# Patient Record
Sex: FEMALE | Race: WHITE | Employment: FULL TIME | URBAN - NONMETROPOLITAN AREA
[De-identification: names, ages, dates, MRNs, and addresses within clinical notes are randomized per-mention and may not be internally consistent; named-entity substitution may affect disease eponyms.]

---

## 2023-01-29 PROBLEM — E89.0 POSTOPERATIVE HYPOTHYROIDISM: Status: ACTIVE | Noted: 2023-01-29

## 2023-01-29 PROBLEM — Z98.890 S/P THYROID SURGERY: Status: ACTIVE | Noted: 2023-01-29

## 2023-01-29 PROBLEM — S92.919A TOE FRACTURE: Status: ACTIVE | Noted: 2023-01-29

## 2023-01-29 PROBLEM — D12.6 TUBULAR ADENOMA OF COLON: Status: ACTIVE | Noted: 2023-01-29

## 2023-01-29 PROBLEM — E55.9 MILD VITAMIN D DEFICIENCY: Status: ACTIVE | Noted: 2023-01-29

## 2023-01-29 PROBLEM — D72.819 LEUKOPENIA: Status: ACTIVE | Noted: 2023-01-29

## 2023-01-29 PROBLEM — S99.921A INJURY OF RIGHT FOOT: Status: ACTIVE | Noted: 2023-01-29

## 2023-01-29 RX ORDER — LEVOTHYROXINE SODIUM 50 UG/1
1 TABLET ORAL DAILY
COMMUNITY
End: 2023-03-14 | Stop reason: SDUPTHER

## 2023-01-29 RX ORDER — ERGOCALCIFEROL 1.25 MG/1
1 CAPSULE ORAL
COMMUNITY
End: 2024-03-15 | Stop reason: WASHOUT

## 2023-01-29 RX ORDER — TRETINOIN 1 MG/G
CREAM TOPICAL
COMMUNITY

## 2023-03-14 ENCOUNTER — OFFICE VISIT (OUTPATIENT)
Dept: PRIMARY CARE | Facility: CLINIC | Age: 53
End: 2023-03-14
Payer: COMMERCIAL

## 2023-03-14 ENCOUNTER — LAB (OUTPATIENT)
Dept: LAB | Facility: LAB | Age: 53
End: 2023-03-14
Payer: COMMERCIAL

## 2023-03-14 VITALS
BODY MASS INDEX: 20.63 KG/M2 | HEIGHT: 68 IN | HEART RATE: 65 BPM | DIASTOLIC BLOOD PRESSURE: 67 MMHG | RESPIRATION RATE: 12 BRPM | SYSTOLIC BLOOD PRESSURE: 102 MMHG | OXYGEN SATURATION: 98 % | WEIGHT: 136.1 LBS | TEMPERATURE: 97.9 F

## 2023-03-14 DIAGNOSIS — E55.9 VITAMIN D DEFICIENCY: ICD-10-CM

## 2023-03-14 DIAGNOSIS — Z00.00 HEALTH MAINTENANCE EXAMINATION: ICD-10-CM

## 2023-03-14 DIAGNOSIS — E89.0 POSTOPERATIVE HYPOTHYROIDISM: ICD-10-CM

## 2023-03-14 DIAGNOSIS — Z00.00 HEALTH MAINTENANCE EXAMINATION: Primary | ICD-10-CM

## 2023-03-14 LAB
ALANINE AMINOTRANSFERASE (SGPT) (U/L) IN SER/PLAS: 13 U/L (ref 7–45)
ALBUMIN (G/DL) IN SER/PLAS: 4.6 G/DL (ref 3.4–5)
ALKALINE PHOSPHATASE (U/L) IN SER/PLAS: 53 U/L (ref 33–110)
ANION GAP IN SER/PLAS: 12 MMOL/L (ref 10–20)
ASPARTATE AMINOTRANSFERASE (SGOT) (U/L) IN SER/PLAS: 16 U/L (ref 9–39)
BASOPHILS (10*3/UL) IN BLOOD BY AUTOMATED COUNT: 0.05 X10E9/L (ref 0–0.1)
BASOPHILS/100 LEUKOCYTES IN BLOOD BY AUTOMATED COUNT: 1 % (ref 0–2)
BILIRUBIN TOTAL (MG/DL) IN SER/PLAS: 0.7 MG/DL (ref 0–1.2)
CALCIUM (MG/DL) IN SER/PLAS: 9.3 MG/DL (ref 8.6–10.6)
CARBON DIOXIDE, TOTAL (MMOL/L) IN SER/PLAS: 27 MMOL/L (ref 21–32)
CHLORIDE (MMOL/L) IN SER/PLAS: 106 MMOL/L (ref 98–107)
CHOLESTEROL (MG/DL) IN SER/PLAS: 206 MG/DL (ref 0–199)
CHOLESTEROL IN HDL (MG/DL) IN SER/PLAS: 77.2 MG/DL
CHOLESTEROL/HDL RATIO: 2.7
CREATININE (MG/DL) IN SER/PLAS: 0.86 MG/DL (ref 0.5–1.05)
EOSINOPHILS (10*3/UL) IN BLOOD BY AUTOMATED COUNT: 0.09 X10E9/L (ref 0–0.7)
EOSINOPHILS/100 LEUKOCYTES IN BLOOD BY AUTOMATED COUNT: 1.8 % (ref 0–6)
ERYTHROCYTE DISTRIBUTION WIDTH (RATIO) BY AUTOMATED COUNT: 12.3 % (ref 11.5–14.5)
ERYTHROCYTE MEAN CORPUSCULAR HEMOGLOBIN CONCENTRATION (G/DL) BY AUTOMATED: 33.5 G/DL (ref 32–36)
ERYTHROCYTE MEAN CORPUSCULAR VOLUME (FL) BY AUTOMATED COUNT: 88 FL (ref 80–100)
ERYTHROCYTES (10*6/UL) IN BLOOD BY AUTOMATED COUNT: 4.42 X10E12/L (ref 4–5.2)
GFR FEMALE: 80 ML/MIN/1.73M2
GLUCOSE (MG/DL) IN SER/PLAS: 93 MG/DL (ref 74–99)
HEMATOCRIT (%) IN BLOOD BY AUTOMATED COUNT: 39.1 % (ref 36–46)
HEMOGLOBIN (G/DL) IN BLOOD: 13.1 G/DL (ref 12–16)
IMMATURE GRANULOCYTES/100 LEUKOCYTES IN BLOOD BY AUTOMATED COUNT: 0 % (ref 0–0.9)
LDL: 115 MG/DL (ref 0–99)
LEUKOCYTES (10*3/UL) IN BLOOD BY AUTOMATED COUNT: 4.9 X10E9/L (ref 4.4–11.3)
LYMPHOCYTES (10*3/UL) IN BLOOD BY AUTOMATED COUNT: 1.39 X10E9/L (ref 1.2–4.8)
LYMPHOCYTES/100 LEUKOCYTES IN BLOOD BY AUTOMATED COUNT: 28.2 % (ref 13–44)
MONOCYTES (10*3/UL) IN BLOOD BY AUTOMATED COUNT: 0.75 X10E9/L (ref 0.1–1)
MONOCYTES/100 LEUKOCYTES IN BLOOD BY AUTOMATED COUNT: 15.2 % (ref 2–10)
NEUTROPHILS (10*3/UL) IN BLOOD BY AUTOMATED COUNT: 2.65 X10E9/L (ref 1.2–7.7)
NEUTROPHILS/100 LEUKOCYTES IN BLOOD BY AUTOMATED COUNT: 53.8 % (ref 40–80)
NRBC (PER 100 WBCS) BY AUTOMATED COUNT: 0 /100 WBC (ref 0–0)
PLATELETS (10*3/UL) IN BLOOD AUTOMATED COUNT: 243 X10E9/L (ref 150–450)
POTASSIUM (MMOL/L) IN SER/PLAS: 4 MMOL/L (ref 3.5–5.3)
PROTEIN TOTAL: 6.6 G/DL (ref 6.4–8.2)
SODIUM (MMOL/L) IN SER/PLAS: 141 MMOL/L (ref 136–145)
TRIGLYCERIDE (MG/DL) IN SER/PLAS: 68 MG/DL (ref 0–149)
UREA NITROGEN (MG/DL) IN SER/PLAS: 16 MG/DL (ref 6–23)
VLDL: 14 MG/DL (ref 0–40)

## 2023-03-14 PROCEDURE — 3008F BODY MASS INDEX DOCD: CPT | Performed by: FAMILY MEDICINE

## 2023-03-14 PROCEDURE — 82652 VIT D 1 25-DIHYDROXY: CPT

## 2023-03-14 PROCEDURE — 1036F TOBACCO NON-USER: CPT | Performed by: FAMILY MEDICINE

## 2023-03-14 PROCEDURE — 99396 PREV VISIT EST AGE 40-64: CPT | Performed by: FAMILY MEDICINE

## 2023-03-14 PROCEDURE — 85025 COMPLETE CBC W/AUTO DIFF WBC: CPT

## 2023-03-14 PROCEDURE — 84443 ASSAY THYROID STIM HORMONE: CPT

## 2023-03-14 PROCEDURE — 80061 LIPID PANEL: CPT

## 2023-03-14 PROCEDURE — 80053 COMPREHEN METABOLIC PANEL: CPT

## 2023-03-14 PROCEDURE — 36415 COLL VENOUS BLD VENIPUNCTURE: CPT

## 2023-03-14 RX ORDER — PROGESTERONE 100 MG/1
100 CAPSULE ORAL NIGHTLY
COMMUNITY
Start: 2023-02-27

## 2023-03-14 RX ORDER — LEVOTHYROXINE SODIUM 50 UG/1
50 CAPSULE ORAL DAILY
COMMUNITY

## 2023-03-14 RX ORDER — CLINDAMYCIN PHOSPHATE AND BENZOYL PEROXIDE 10; 37.5 MG/G; MG/G
GEL TOPICAL
COMMUNITY
Start: 2022-12-20

## 2023-03-14 RX ORDER — LIOTHYRONINE SODIUM 5 UG/1
5 TABLET ORAL
COMMUNITY
Start: 2023-02-23

## 2023-03-14 RX ORDER — ESTRADIOL 0.03 MG/D
1 FILM, EXTENDED RELEASE TRANSDERMAL
COMMUNITY
Start: 2023-03-10

## 2023-03-14 ASSESSMENT — PAIN SCALES - GENERAL: PAINLEVEL: 0-NO PAIN

## 2023-03-14 ASSESSMENT — PATIENT HEALTH QUESTIONNAIRE - PHQ9
1. LITTLE INTEREST OR PLEASURE IN DOING THINGS: NOT AT ALL
SUM OF ALL RESPONSES TO PHQ9 QUESTIONS 1 AND 2: 0
2. FEELING DOWN, DEPRESSED OR HOPELESS: NOT AT ALL

## 2023-03-14 NOTE — PROGRESS NOTES
"Subjective   Patient ID: Mirian Ramos is a 52 y.o. female who presents for Annual Exam (No other concerns to address) and Foot Injury (Broke toe in October on her right foot).    [facility]   Mirian was seen today for an annual wellness review.  She overall feels well, does feel some aches and pains more than previous years.  She remains active, does yoga, and more recently kettle bells.  She does have a degree of right medial epicondylar region pain, correlates with her kettle bell program.    Her gynecology care is up-to-date, including Pap smear, mammogram, thyroid management.    Colonoscopy is not due until 2027.  Review of Systems  The full, 10+ multi-organ review of systems, is within normal limits with the exception of what is noted above in HPI.  Objective   /67 (BP Location: Right arm, Patient Position: Sitting, BP Cuff Size: Adult)   Pulse 65   Temp 36.6 °C (97.9 °F) (Temporal)   Resp 12   Ht 1.727 m (5' 8\")   Wt 61.7 kg (136 lb 1.6 oz)   SpO2 98%   BMI 20.69 kg/m²     Physical Exam  Constitutional/General appearance: alert, oriented, well-appearing, in no distress  Head and face exam is normal  No scleral icterus or conjunctival erythema present  Hearing is grossly normal  Respiratory effort is normal, no dyspnea noted  Cortical function is normal  Mood, affect, are pleasant, appropriate, and interactive.  Insight is normal  Cardiac exam reveals a regular rate and rhythm, no murmurs, rubs or gallops present.   Lungs are clear bilaterally.    No lower extremity edema present.    Assessment/Plan     Today Mirian's wellness care was reviewed.  Please see HPI for details.  We will check routine labs today, call when the results are available.  Hormone care per gynecology wellness program.    Follow-up in 1 year.     "

## 2023-03-16 ENCOUNTER — TELEPHONE (OUTPATIENT)
Dept: PRIMARY CARE | Facility: CLINIC | Age: 53
End: 2023-03-16
Payer: COMMERCIAL

## 2023-03-16 DIAGNOSIS — E89.0 POSTOPERATIVE HYPOTHYROIDISM: Primary | ICD-10-CM

## 2023-03-16 LAB — THYROTROPIN (MIU/L) IN SER/PLAS BY DETECTION LIMIT <= 0.05 MIU/L: 1.22 MIU/L (ref 0.44–3.98)

## 2023-03-17 LAB — VITAMIN D 1,25-DIHYDROXY: 62.5 PG/ML (ref 19.9–79.3)

## 2023-07-19 NOTE — PROGRESS NOTES
Subjective   Patient ID: Mirian Ramos is a 52 y.o. female who presents for Sinus Problem (Pt is here with sinus issues- ears pop, can hear herself breathing, left ear hurts, no cough, no congestion).    HPI     Patient of Sameer Shepherd MD    Patient here for evaluation of otalgia and upper respiratory symptoms.    She states her symptoms started a few weeks ago.  She endorses sinus pressure, sinus drainage, and ear popping.  She does have some otalgia on the left and muffled hearing.  Her symptoms have remained the same since onset overall, however, she notes she started having stuffy nose today.    No one around her experiencing similar symptoms.  No recent flights.    She denies any sore throat, cough, or congestion.  She denies chest pain, palpitations, or SOB.  She denies fevers, chills, or unexplained weight changes.  She denies history of seasonal allergies.     Home treatments include hydrogen peroxide if related to ear wax but this did not provide relief of her discomfort. She is also taking OTC Ibuprofen.    Review of Systems   All other systems reviewed and are negative.      Objective   BP 95/55 (BP Location: Right arm, Patient Position: Sitting, BP Cuff Size: Small adult)   Pulse 60   Temp 36.5 °C (97.7 °F) (Temporal)   Resp 14   Wt 60.6 kg (133 lb 9.6 oz)   SpO2 97%   BMI 20.31 kg/m²     Physical Exam  Vitals and nursing note reviewed.   Constitutional:       General: She is not in acute distress.     Appearance: Normal appearance. She is not toxic-appearing.   HENT:      Head: Normocephalic and atraumatic.      Right Ear: Tympanic membrane, ear canal and external ear normal.      Left Ear: Tympanic membrane, ear canal and external ear normal.      Mouth/Throat:      Lips: Pink.      Mouth: Mucous membranes are moist.      Pharynx: Oropharynx is clear. No posterior oropharyngeal erythema.      Comments: Tonsoliths noted on left side  Eyes:      Extraocular Movements: Extraocular movements  intact.      Pupils: Pupils are equal, round, and reactive to light.      Comments: Coryza noted bilaterally   Neck:      Thyroid: No thyromegaly.   Cardiovascular:      Rate and Rhythm: Normal rate and regular rhythm.      Heart sounds: No murmur heard.     No friction rub. No gallop.   Pulmonary:      Effort: Pulmonary effort is normal.      Breath sounds: Normal breath sounds. No wheezing, rhonchi or rales.   Abdominal:      General: Bowel sounds are normal. There is no distension.      Palpations: Abdomen is soft. There is no mass.      Tenderness: There is no abdominal tenderness. There is no guarding.   Musculoskeletal:      Right lower leg: No edema.      Left lower leg: No edema.   Lymphadenopathy:      Cervical: No cervical adenopathy.   Skin:     General: Skin is warm and dry.   Neurological:      General: No focal deficit present.      Mental Status: She is alert and oriented to person, place, and time.   Psychiatric:         Mood and Affect: Mood normal.         Behavior: Behavior normal.         Assessment/Plan   Diagnoses and all orders for this visit:  Dysfunction of Eustachian tube, unspecified laterality  Coryza  Tonsillith    Patient's exam was not suggestive of bacterial infection but there were signs of inflammation such as coryza and tonsolith's and Eustachian Tube Dysfunction.     Would not recommend treating with antibiotic or oral steroid unless worsening or not improving in 7-10 days with the below measures (nasal sprays, mobilization of fluids. Patient to call office for prescription if symptoms worsen or not improved.    If secretions are THICK:   Drink at least 6-8 glasses of water daily to thin secretions.    Mucinex can be used if secretions remain thick.    If secretions are THIN, watery, and abundant:  Antihistamine such as loratadine (claritin) can help dry up a drippy nose.      Supplementation of 75 mg of zinc at first sign of cold-like symptoms has been shown to improve symptoms.       Gargling with warm salt water can help clear post nasal drip and soothe a sore throat.  Throat lozenges can also be helpful.      Fever or aches can be helped by taking acetaminophen (Tylenol) every four hours as needed, or ibuprofen (Motrin, Advil) or naproxen (Aleve) as directed if you are able.     EUSTACHIAN TUBE DISORDER  Symptoms described are consistent with Eustachian tube disorder. This is an impairment in the function of the ventilation tube which connects the middle ear ( behind the eardrum)  to the back of the throat. The opening of this ventilation tube, which allows the middle ear to drain fluid and to ventilate air freely,  can sometimes become inflamed and does not function properly. This can be caused by many problems, such as allergies, irritants such as smoke or dust, infections such as colds, ear infections,  or sinusitis, swelling for other reason such as acid reflux, or mechanical blockage with things such as polyps or other growths.  The symptoms of Eustachian tube dysfunction include ear fullness, pressure, whooshing or popping noises, difficulty with hearing, or pain-similar to changing altitude in an airplane.    To minimize these symptoms, we look to treat the underlying cause of poor ventilation. Swelling related to allergies or inflammation can sometimes be helped by nasal steroid sprays such as Flonase or Nasacort over time (7 - 10 days)     If you do not have high blood pressure or any other contraindication, you can use a nasal spray decongestant for up to 3 days. These are sprays such as Vicks or Afrin spray decongestants.    Other options to help open up nasal passages and Eustachian tubes can include non-medicine intervention such as steam, essential oils such as Eucalyptus, peppermint, rosemary added to a diffuser or humidifier.    Mobilizing fluid and helping with congestion through repetitive exercise such as rebounding on a mini- trampoline, jumping jacks, or running may  all help.     If you have allergies, adding in an antihistamine such as Claritin or Allegra can help quiet underlying swelling.    If you have a cold or sinus infection, after the infection begins to clear, the symptoms should improve on their own.    The Reinaldo technique can be used, which is a Osteopathic manual medicine technique to help with ear fullness.   Perform by pulling up and back on your ear- use a firm steady force and pull your ear up and back, until you feel a deep tug at the base of your ear. At the same time, use your other hand to pull the angle of your jaw (the same side of the face as the ear you are tugging) in the opposite direction - pull and massage the jaw area forward and down, away from your ear, thus stretching out the deep tissues that surround the Eustachian tube.        As a last resort for stubborn Eustachian tube dysfunction, we will occasionally use a very short course of prednisone or a trial of antibiotics if there are associated infectious symptoms.  Call the office back if the above measures do not help, and we can discuss next steps.     Follow-up with Dr. Shepherd for routine care.  Call for sooner follow-up if needed.     Scribe Attestation  By signing my name below, Camila CORDOBA Scribe   attest that this documentation has been prepared under the direction and in the presence of Lisa Nuñez DO.

## 2023-07-20 ENCOUNTER — OFFICE VISIT (OUTPATIENT)
Dept: PRIMARY CARE | Facility: CLINIC | Age: 53
End: 2023-07-20
Payer: COMMERCIAL

## 2023-07-20 VITALS
RESPIRATION RATE: 14 BRPM | WEIGHT: 133.6 LBS | SYSTOLIC BLOOD PRESSURE: 95 MMHG | HEART RATE: 60 BPM | DIASTOLIC BLOOD PRESSURE: 55 MMHG | BODY MASS INDEX: 20.31 KG/M2 | OXYGEN SATURATION: 97 % | TEMPERATURE: 97.7 F

## 2023-07-20 DIAGNOSIS — J00 CORYZA: ICD-10-CM

## 2023-07-20 DIAGNOSIS — H69.90 DYSFUNCTION OF EUSTACHIAN TUBE, UNSPECIFIED LATERALITY: Primary | ICD-10-CM

## 2023-07-20 DIAGNOSIS — J35.8 TONSILLITH: ICD-10-CM

## 2023-07-20 PROCEDURE — 3008F BODY MASS INDEX DOCD: CPT | Performed by: FAMILY MEDICINE

## 2023-07-20 PROCEDURE — 1036F TOBACCO NON-USER: CPT | Performed by: FAMILY MEDICINE

## 2023-07-20 PROCEDURE — 99213 OFFICE O/P EST LOW 20 MIN: CPT | Performed by: FAMILY MEDICINE

## 2023-07-20 NOTE — PATIENT INSTRUCTIONS
Patient's exam was not suggestive of bacterial infection but there were signs of inflammation such as coryza and tonsolith's and Eustachian Tube Dysfunction.     Would not recommend treating with antibiotic or oral steroid unless worsening or not improving in 7-10 days with the below measures (nasal sprays, mobilization of fluids. Patient to call office for prescription if symptoms worsen or not improved.    If secretions are THICK:   Drink at least 6-8 glasses of water daily to thin secretions.    Mucinex can be used if secretions remain thick.    If secretions are THIN, watery, and abundant:  Antihistamine such as loratadine (claritin) can help dry up a drippy nose.      Supplementation of 75 mg of zinc at first sign of cold-like symptoms has been shown to improve symptoms.      Gargling with warm salt water can help clear post nasal drip and soothe a sore throat.  Throat lozenges can also be helpful.      Fever or aches can be helped by taking acetaminophen (Tylenol) every four hours as needed, or ibuprofen (Motrin, Advil) or naproxen (Aleve) as directed if you are able.     EUSTACHIAN TUBE DISORDER  Symptoms described are consistent with Eustachian tube disorder. This is an impairment in the function of the ventilation tube which connects the middle ear ( behind the eardrum)  to the back of the throat. The opening of this ventilation tube, which allows the middle ear to drain fluid and to ventilate air freely,  can sometimes become inflamed and does not function properly. This can be caused by many problems, such as allergies, irritants such as smoke or dust, infections such as colds, ear infections,  or sinusitis, swelling for other reason such as acid reflux, or mechanical blockage with things such as polyps or other growths.  The symptoms of Eustachian tube dysfunction include ear fullness, pressure, whooshing or popping noises, difficulty with hearing, or pain-similar to changing altitude in an  airplane.    To minimize these symptoms, we look to treat the underlying cause of poor ventilation. Swelling related to allergies or inflammation can sometimes be helped by nasal steroid sprays such as Flonase or Nasacort over time (7 - 10 days)     If you do not have high blood pressure or any other contraindication, you can use a nasal spray decongestant for up to 3 days. These are sprays such as Vicks or Afrin spray decongestants.    Other options to help open up nasal passages and Eustachian tubes can include non-medicine intervention such as steam, essential oils such as Eucalyptus, peppermint, rosemary added to a diffuser or humidifier.    Mobilizing fluid and helping with congestion through repetitive exercise such as rebounding on a mini- trampoline, jumping jacks, or running may all help.     If you have allergies, adding in an antihistamine such as Claritin or Allegra can help quiet underlying swelling.    If you have a cold or sinus infection, after the infection begins to clear, the symptoms should improve on their own.    The Reinaldo technique can be used, which is a Osteopathic manual medicine technique to help with ear fullness.   Perform by pulling up and back on your ear- use a firm steady force and pull your ear up and back, until you feel a deep tug at the base of your ear. At the same time, use your other hand to pull the angle of your jaw (the same side of the face as the ear you are tugging) in the opposite direction - pull and massage the jaw area forward and down, away from your ear, thus stretching out the deep tissues that surround the Eustachian tube.        As a last resort for stubborn Eustachian tube dysfunction, we will occasionally use a very short course of prednisone or a trial of antibiotics if there are associated infectious symptoms.  Call the office back if the above measures do not help, and we can discuss next steps.

## 2024-03-15 ENCOUNTER — OFFICE VISIT (OUTPATIENT)
Dept: PRIMARY CARE | Facility: CLINIC | Age: 54
End: 2024-03-15
Payer: COMMERCIAL

## 2024-03-15 ENCOUNTER — LAB (OUTPATIENT)
Dept: LAB | Facility: LAB | Age: 54
End: 2024-03-15
Payer: COMMERCIAL

## 2024-03-15 VITALS
DIASTOLIC BLOOD PRESSURE: 62 MMHG | TEMPERATURE: 98.7 F | HEART RATE: 64 BPM | HEIGHT: 68 IN | OXYGEN SATURATION: 99 % | WEIGHT: 135.2 LBS | SYSTOLIC BLOOD PRESSURE: 103 MMHG | BODY MASS INDEX: 20.49 KG/M2

## 2024-03-15 DIAGNOSIS — E55.9 VITAMIN D DEFICIENCY: ICD-10-CM

## 2024-03-15 DIAGNOSIS — E89.0 POSTOPERATIVE HYPOTHYROIDISM: Primary | ICD-10-CM

## 2024-03-15 DIAGNOSIS — Z00.00 HEALTH MAINTENANCE EXAMINATION: ICD-10-CM

## 2024-03-15 DIAGNOSIS — E89.0 POSTOPERATIVE HYPOTHYROIDISM: ICD-10-CM

## 2024-03-15 LAB
25(OH)D3 SERPL-MCNC: 52 NG/ML (ref 30–100)
ALBUMIN SERPL BCP-MCNC: 4.5 G/DL (ref 3.4–5)
ALP SERPL-CCNC: 55 U/L (ref 33–110)
ALT SERPL W P-5'-P-CCNC: 18 U/L (ref 7–45)
ANION GAP SERPL CALC-SCNC: 11 MMOL/L (ref 10–20)
APPEARANCE UR: CLEAR
AST SERPL W P-5'-P-CCNC: 21 U/L (ref 9–39)
BASOPHILS # BLD AUTO: 0.05 X10*3/UL (ref 0–0.1)
BASOPHILS NFR BLD AUTO: 0.9 %
BILIRUB SERPL-MCNC: 0.7 MG/DL (ref 0–1.2)
BILIRUB UR STRIP.AUTO-MCNC: NEGATIVE MG/DL
BUN SERPL-MCNC: 15 MG/DL (ref 6–23)
CALCIUM SERPL-MCNC: 9.5 MG/DL (ref 8.6–10.6)
CHLORIDE SERPL-SCNC: 104 MMOL/L (ref 98–107)
CHOLEST SERPL-MCNC: 219 MG/DL (ref 0–199)
CHOLESTEROL/HDL RATIO: 2.7
CO2 SERPL-SCNC: 28 MMOL/L (ref 21–32)
COLOR UR: NORMAL
CREAT SERPL-MCNC: 0.83 MG/DL (ref 0.5–1.05)
EGFRCR SERPLBLD CKD-EPI 2021: 83 ML/MIN/1.73M*2
EOSINOPHIL # BLD AUTO: 0.09 X10*3/UL (ref 0–0.7)
EOSINOPHIL NFR BLD AUTO: 1.6 %
ERYTHROCYTE [DISTWIDTH] IN BLOOD BY AUTOMATED COUNT: 12.7 % (ref 11.5–14.5)
GLUCOSE SERPL-MCNC: 90 MG/DL (ref 74–99)
GLUCOSE UR STRIP.AUTO-MCNC: NORMAL MG/DL
HCT VFR BLD AUTO: 40.1 % (ref 36–46)
HDLC SERPL-MCNC: 81.1 MG/DL
HGB BLD-MCNC: 13.5 G/DL (ref 12–16)
IMM GRANULOCYTES # BLD AUTO: 0.01 X10*3/UL (ref 0–0.7)
IMM GRANULOCYTES NFR BLD AUTO: 0.2 % (ref 0–0.9)
KETONES UR STRIP.AUTO-MCNC: NEGATIVE MG/DL
LDLC SERPL CALC-MCNC: 123 MG/DL
LEUKOCYTE ESTERASE UR QL STRIP.AUTO: NEGATIVE
LYMPHOCYTES # BLD AUTO: 1.17 X10*3/UL (ref 1.2–4.8)
LYMPHOCYTES NFR BLD AUTO: 20.3 %
MCH RBC QN AUTO: 29.5 PG (ref 26–34)
MCHC RBC AUTO-ENTMCNC: 33.7 G/DL (ref 32–36)
MCV RBC AUTO: 88 FL (ref 80–100)
MONOCYTES # BLD AUTO: 0.65 X10*3/UL (ref 0.1–1)
MONOCYTES NFR BLD AUTO: 11.3 %
NEUTROPHILS # BLD AUTO: 3.78 X10*3/UL (ref 1.2–7.7)
NEUTROPHILS NFR BLD AUTO: 65.7 %
NITRITE UR QL STRIP.AUTO: NEGATIVE
NON HDL CHOLESTEROL: 138 MG/DL (ref 0–149)
NRBC BLD-RTO: 0 /100 WBCS (ref 0–0)
PH UR STRIP.AUTO: 6.5 [PH]
PLATELET # BLD AUTO: 227 X10*3/UL (ref 150–450)
POTASSIUM SERPL-SCNC: 4.2 MMOL/L (ref 3.5–5.3)
PROT SERPL-MCNC: 6.7 G/DL (ref 6.4–8.2)
PROT UR STRIP.AUTO-MCNC: NEGATIVE MG/DL
RBC # BLD AUTO: 4.58 X10*6/UL (ref 4–5.2)
RBC # UR STRIP.AUTO: NEGATIVE /UL
SODIUM SERPL-SCNC: 139 MMOL/L (ref 136–145)
SP GR UR STRIP.AUTO: 1.01
T3FREE SERPL-MCNC: 3.4 PG/ML (ref 2.3–4.2)
T4 FREE SERPL-MCNC: 1.14 NG/DL (ref 0.78–1.48)
TRIGL SERPL-MCNC: 76 MG/DL (ref 0–149)
TSH SERPL-ACNC: 1.34 MIU/L (ref 0.44–3.98)
UROBILINOGEN UR STRIP.AUTO-MCNC: NORMAL MG/DL
VLDL: 15 MG/DL (ref 0–40)
WBC # BLD AUTO: 5.8 X10*3/UL (ref 4.4–11.3)

## 2024-03-15 PROCEDURE — 81003 URINALYSIS AUTO W/O SCOPE: CPT

## 2024-03-15 PROCEDURE — 80061 LIPID PANEL: CPT

## 2024-03-15 PROCEDURE — 84443 ASSAY THYROID STIM HORMONE: CPT

## 2024-03-15 PROCEDURE — 84439 ASSAY OF FREE THYROXINE: CPT

## 2024-03-15 PROCEDURE — 36415 COLL VENOUS BLD VENIPUNCTURE: CPT

## 2024-03-15 PROCEDURE — 1036F TOBACCO NON-USER: CPT | Performed by: FAMILY MEDICINE

## 2024-03-15 PROCEDURE — 84481 FREE ASSAY (FT-3): CPT

## 2024-03-15 PROCEDURE — 99396 PREV VISIT EST AGE 40-64: CPT | Performed by: FAMILY MEDICINE

## 2024-03-15 PROCEDURE — 82306 VITAMIN D 25 HYDROXY: CPT

## 2024-03-15 PROCEDURE — 80053 COMPREHEN METABOLIC PANEL: CPT

## 2024-03-15 PROCEDURE — 85025 COMPLETE CBC W/AUTO DIFF WBC: CPT

## 2024-03-15 NOTE — PROGRESS NOTES
"Subjective   Patient ID: Mirian Ramos is a 53 y.o. female who presents for Annual Exam.    HPI   Mirian was seen today for an annual wellness exam, review of thyroid medications.  She overall feels in her usual state of health, weight is stable, she continues to exercise and do yoga regularly.  Labs done annually are excellent, good cholesterol is nice and high, LDL minimally elevated, but more than offset by her HDL.  Thyroid functions were normal last year  Colonoscopy is up-to-date  Mammogram and Pap smears per gynecology.  She takes her Vivelle patch and progesterone capsule as prescribed.  She is eligible for a Tdap and Shingrix  Review of Systems  The full review of systems is negative with the exception of what is noted in HPI    Objective   /62   Pulse 64   Temp 37.1 °C (98.7 °F)   Ht 1.727 m (5' 8\")   Wt 61.3 kg (135 lb 3.2 oz)   SpO2 99%   BMI 20.56 kg/m²     Physical Exam  Constitutional/General appearance: alert, oriented, well-appearing, in no distress  Head and face exam is normal  No scleral icterus or conjunctival erythema present  Hearing is grossly normal  Respiratory effort is normal, no dyspnea noted  Cortical function is normal  Mood, affect, are pleasant, appropriate, and interactive.  Insight is normal  Cardiac exam reveals a regular rate and rhythm, no murmurs, rubs or gallops present.   Lungs are clear bilaterally.    No lower extremity edema present.  Neck lymph node exam thyroid region exam normal  Abdominal and ENT exams are normal  Assessment/Plan     Today your wellness care was reviewed.  Please keep up your vision and dental care.  Focus on lifestyle efforts, including a goal of 30 minutes of exercise 5 days/week.    A healthy diet rich in fruits, vegetables, and lean proteins encouraged.  Healthy fats, such as can be found in nuts, olives, avocados are encouraged (unless allergies prohibit).  Avoid/minimize junk and fast food    Diagnostics will be checked as/if " discussed  Continue current thyroid medication  Colonoscopy up-to-date as of 2022  Gyn care UTD per Gyn  Consider Tdap and Shingrix in the future, declined today.    Follow-up in 1 year  **Portions of this medical record have been created using voice recognition software and may have minor errors which are inherent in voice recognition systems. It has not been fully edited for typographical or grammatical errors**

## 2024-05-20 ENCOUNTER — TELEPHONE (OUTPATIENT)
Dept: PRIMARY CARE | Facility: CLINIC | Age: 54
End: 2024-05-20
Payer: COMMERCIAL

## 2024-05-20 DIAGNOSIS — H92.09 OTALGIA, UNSPECIFIED LATERALITY: Primary | ICD-10-CM

## 2024-05-20 RX ORDER — PREDNISONE 10 MG/1
TABLET ORAL
Qty: 20 TABLET | Refills: 0 | Status: SHIPPED | OUTPATIENT
Start: 2024-05-20

## 2024-05-20 NOTE — TELEPHONE ENCOUNTER
Pt is still having ear pain and said that you told her to do Flonase and it has not helped. Pt is asking what else she can do? Please advise?

## 2024-05-20 NOTE — TELEPHONE ENCOUNTER
Please find out if it hurts to chew on that side, or if it hurts to open her mouth widely (sometimes jaw/TMJ pain can refer pain to the ear).  Either way I'd recommend a short course of prednisone to reduce inflammation, will send Rx

## 2025-03-18 ENCOUNTER — APPOINTMENT (OUTPATIENT)
Dept: PRIMARY CARE | Facility: CLINIC | Age: 55
End: 2025-03-18
Payer: COMMERCIAL

## 2025-03-18 ENCOUNTER — OFFICE VISIT (OUTPATIENT)
Dept: PRIMARY CARE | Facility: CLINIC | Age: 55
End: 2025-03-18
Payer: COMMERCIAL

## 2025-03-18 VITALS
BODY MASS INDEX: 21.25 KG/M2 | SYSTOLIC BLOOD PRESSURE: 100 MMHG | DIASTOLIC BLOOD PRESSURE: 66 MMHG | WEIGHT: 135.4 LBS | HEIGHT: 67 IN | TEMPERATURE: 98.2 F | HEART RATE: 67 BPM | OXYGEN SATURATION: 98 %

## 2025-03-18 DIAGNOSIS — Z98.890 S/P THYROID SURGERY: Primary | ICD-10-CM

## 2025-03-18 DIAGNOSIS — Z00.00 WELL ADULT EXAM: ICD-10-CM

## 2025-03-18 PROCEDURE — 3008F BODY MASS INDEX DOCD: CPT | Performed by: STUDENT IN AN ORGANIZED HEALTH CARE EDUCATION/TRAINING PROGRAM

## 2025-03-18 PROCEDURE — 99396 PREV VISIT EST AGE 40-64: CPT | Performed by: STUDENT IN AN ORGANIZED HEALTH CARE EDUCATION/TRAINING PROGRAM

## 2025-03-18 RX ORDER — ESTRADIOL 0.04 MG/D
FILM, EXTENDED RELEASE TRANSDERMAL
COMMUNITY
Start: 2025-02-20

## 2025-03-18 RX ORDER — PROGESTERONE 200 MG/1
CAPSULE ORAL
COMMUNITY
Start: 2024-09-17 | End: 2025-03-18 | Stop reason: WASHOUT

## 2025-03-18 RX ORDER — TRETINOIN 0.25 MG/G
CREAM TOPICAL
COMMUNITY
Start: 2025-02-20

## 2025-03-18 ASSESSMENT — ANXIETY QUESTIONNAIRES
7. FEELING AFRAID AS IF SOMETHING AWFUL MIGHT HAPPEN: NOT AT ALL
5. BEING SO RESTLESS THAT IT IS HARD TO SIT STILL: SEVERAL DAYS
6. BECOMING EASILY ANNOYED OR IRRITABLE: NOT AT ALL
1. FEELING NERVOUS, ANXIOUS, OR ON EDGE: SEVERAL DAYS
3. WORRYING TOO MUCH ABOUT DIFFERENT THINGS: SEVERAL DAYS
IF YOU CHECKED OFF ANY PROBLEMS ON THIS QUESTIONNAIRE, HOW DIFFICULT HAVE THESE PROBLEMS MADE IT FOR YOU TO DO YOUR WORK, TAKE CARE OF THINGS AT HOME, OR GET ALONG WITH OTHER PEOPLE: NOT DIFFICULT AT ALL
2. NOT BEING ABLE TO STOP OR CONTROL WORRYING: NOT AT ALL
GAD7 TOTAL SCORE: 4
4. TROUBLE RELAXING: SEVERAL DAYS

## 2025-03-18 ASSESSMENT — PATIENT HEALTH QUESTIONNAIRE - PHQ9
3. TROUBLE FALLING OR STAYING ASLEEP OR SLEEPING TOO MUCH: SEVERAL DAYS
8. MOVING OR SPEAKING SO SLOWLY THAT OTHER PEOPLE COULD HAVE NOTICED. OR THE OPPOSITE, BEING SO FIGETY OR RESTLESS THAT YOU HAVE BEEN MOVING AROUND A LOT MORE THAN USUAL: NOT AT ALL
5. POOR APPETITE OR OVEREATING: NOT AT ALL
6. FEELING BAD ABOUT YOURSELF - OR THAT YOU ARE A FAILURE OR HAVE LET YOURSELF OR YOUR FAMILY DOWN: NOT AT ALL
9. THOUGHTS THAT YOU WOULD BE BETTER OFF DEAD, OR OF HURTING YOURSELF: NOT AT ALL
SUM OF ALL RESPONSES TO PHQ9 QUESTIONS 1 AND 2: 0
1. LITTLE INTEREST OR PLEASURE IN DOING THINGS: NOT AT ALL
7. TROUBLE CONCENTRATING ON THINGS, SUCH AS READING THE NEWSPAPER OR WATCHING TELEVISION: NOT AT ALL
SUM OF ALL RESPONSES TO PHQ QUESTIONS 1-9: 1
4. FEELING TIRED OR HAVING LITTLE ENERGY: NOT AT ALL
2. FEELING DOWN, DEPRESSED OR HOPELESS: NOT AT ALL

## 2025-03-18 NOTE — PATIENT INSTRUCTIONS
To schedule an appointment to get your labwork done at the [facility], please go to: [email or web address]

## 2025-03-18 NOTE — PROGRESS NOTES
FAMILY MEDICINE  WELL ADULT VISIT   Mirian Ramos  [id number]  1970    PCP: Latricia Hartley DO     Chief Complaint:   Chief Complaint   Patient presents with    Annual Exam     Pt presents for annual physical exam- ABN was given to pt and signed, pt verbalized understanding. Pt states no concerns/questions at this time.     SUBJECTIVE     Mirian Ramos is a 54 y.o. English-speaking female, who presents to the clinic for well adult exam.     Patient is new to me as PCP, as Dr. Shepherd left the practice in January.     20 questions.     HRT   - [facility] Webster prescribed  - Hx of Thyroid surgery, so all happened at the same time.   - Sleeping was off.   - Just went to them not long ago.   - Has follow-up in August.     HPI    FamHx  - Stroke: denied  - MI: denied  - Mat gma: ovarian ca (older when dx)   - Denies family history of breast, uterine, endometrial, pancreatic, prostate, colorectal, or skin cancer.       Cancer Screening  - Pap Smear (21-30yo, 30-64yo, per [facility]): 8/2021  - Mammogram (Biennial, 40-73yo): 7/2024  - Colonoscopy (start 44yo):    - 8/2022: q5y d/t polyp hx  - Low dose CT (50-81yo w 20pk, current or quit w/in 15yr): NI  - PSA (55-70yo M): NI    CVD   - BMI: Body mass index is 21.21 kg/m².  - ASCVD: The ASCVD Risk score (Stewart DK, et al., 2019) failed to calculate for the following reasons:    The valid HDL cholesterol range is 20 to 100 mg/dL  - BP: 100/66  - DM: Last A1c NI  - Cholesterol: Last lipid panel due    Immunizations  - Tdap (q10y): declined  - COVID (6yo+): x2  - Influenza (annual): 11/2024  - HPV (9-44yo): aged out  - Shingrix (>49yo): discussed, declined  - PNA (>49yo): discussed, declined    Other Screening  - Depression: PHQ-9    - Osteoporosis (Dexa >64yo, q2h if abnl): NYI  - AAA (M 65-76yo ever smoke): NI  - HIV (15-64yo, once in lifetime): declined  - Hep C (18-80yo, once in lifetime): declined  - Syphilis (people at increased risk): declined  - GC/CT (F  sexually active <26yo, >26yo at increased risk):       The following portions of the patient's chart were reviewed in this encounter and updated as appropriate:  Tobacco  Allergies  Meds  Problems  Med Hx  Surg Hx  Fam Hx     .     Social Hx Social History     Socioeconomic History    Marital status: Single   Tobacco Use    Smoking status: Never    Smokeless tobacco: Never   Vaping Use    Vaping status: Never Used   Substance and Sexual Activity    Alcohol use: Never    Drug use: Never      Medical Hx Past Medical History:   Diagnosis Date    Nontoxic single thyroid nodule 08/03/2016    Cyst of thyroid determined by ultrasound    Pain, unspecified 11/04/2020    Body aches     Patient Active Problem List   Diagnosis    Leukopenia    Mild vitamin D deficiency    Postoperative hypothyroidism    S/P thyroid surgery    Tubular adenoma of colon    Injury of right foot    Toe fracture    Well adult exam      Allergies No Known Allergies   Surgical Hx Past Surgical History:   Procedure Laterality Date    EYE SURGERY  02/12/2015    Eye Surgery    OTHER SURGICAL HISTORY  09/12/2016    Thyroid Surgery Thyroid Lobectomy Left Lobe      Family Hx Family History   Problem Relation Name Age of Onset    Thyroid disease Mother      Uterine cancer Maternal Grandmother      Thyroid disease Maternal Grandfather        Immunizations Immunization History   Administered Date(s) Administered    Influenza, seasonal, injectable 11/14/2024    Pfizer Purple Cap SARS-CoV-2 09/25/2021, 10/16/2021      Medication List Current Outpatient Medications   Medication Instructions    estradiol (Vivelle-DOT) 0.0375 mg/24 hr APPLY 1 PATCH TOPICALLY TO THE SKIN 2 TIMES A WEEK    levothyroxine (TIROSINT) 50 mcg, Daily    liothyronine (CYTOMEL) 5 mcg, Every Day    Onexton 1.2 %(1 % base) -3.75 % gel with pump APPLY TOPICALLY TO THE AFFECTED AREA OF FACE EVERY MORNING.    predniSONE (Deltasone) 10 mg tablet Take 4 tabs daily for 2 days, then 3 tabs  "daily for 2 days, then 2 tabs daily for 2 days, then 1 tab daily for 2 days, then stop.  Take with food.    progesterone (PROMETRIUM) 200 mg, Nightly    Retin-A 0.025 % cream APPLY TO THE AFFECTED AREA(S) EXTERNALLY TO THE FACE AT BEDTIME          OBJECTIVE   /66 (BP Location: Right arm, Patient Position: Sitting, BP Cuff Size: Adult)   Pulse 67   Temp 36.8 °C (98.2 °F) (Temporal)   Ht 1.702 m (5' 7\")   Wt 61.4 kg (135 lb 6.4 oz)   SpO2 98%   BMI 21.21 kg/m²   Vital signs and pulse oximetry reviewed.     Physical Exam  Vitals and nursing note reviewed.   Constitutional:       Appearance: Normal appearance.   HENT:      Head: Normocephalic and atraumatic.      Right Ear: Tympanic membrane, ear canal and external ear normal.      Left Ear: Tympanic membrane, ear canal and external ear normal.      Nose: Nose normal. No congestion or rhinorrhea.   Eyes:      General: No scleral icterus.     Conjunctiva/sclera: Conjunctivae normal.   Cardiovascular:      Rate and Rhythm: Normal rate and regular rhythm.      Heart sounds: No murmur heard.  Pulmonary:      Effort: Pulmonary effort is normal. No respiratory distress.      Breath sounds: Normal breath sounds. No wheezing, rhonchi or rales.   Musculoskeletal:      Cervical back: No rigidity or tenderness.      Right lower leg: No edema.      Left lower leg: No edema.   Lymphadenopathy:      Cervical: No cervical adenopathy.   Skin:     General: Skin is warm.      Coloration: Skin is not jaundiced.   Neurological:      Mental Status: She is alert. Mental status is at baseline.   Psychiatric:         Mood and Affect: Mood normal.         Behavior: Behavior normal.         ASSESSMENT & PLAN     Problem List Items Addressed This Visit       S/P thyroid surgery - Primary    Relevant Orders    CBC and Auto Differential (Completed)    Comprehensive Metabolic Panel (Completed)    Lipid Panel (Completed)    TSH with reflex to Free T4 if abnormal (Completed)    Follow Up " In [facility] - PCP - Health Maintenance    Well adult exam    Current Assessment & Plan     Patient was here for her annual well exam today. Gynecologic exam was not performed today.   - We discussed family hx today, and family hx was updated in chart.   - Patient was screened for depression today.  - I recommend the following cancer screenings at this time:   [] Cervical cancer (21-28yo, 30-64yo, per [facility])  [x] Breast cancer (Biennial, 40-73yo)  [] Colon cancer (start 44yo or earlier if famhx)  [] Lung cancer (50-79yo w 20pk, current or quit w/in 15yr)  - I recommend the following additional screenings at this time:   [] Osteoporosis (Dexa >64yo, q2h if abnl. Sooner if long term steroid use, smoking, heavy EtOH use, hx of fracture, RA, low body weight, famhx hip fracture)  [] HIV (15-64yo, once in lifetime)  [] Hep C (18-80yo, once in lifetime)  [] Syphilis (people at increased risk)  [] GC/CT (F sexually active <24yo, >24yo at increased risk)  - I recommend the following vaccinations at this time:   [x] Tdap (q10y)  [] COVID (4yo+)  [] Influenza (annual)  [] HPV (9-44yo)  [x] Shingrix (>49yo)  [x] PNA (>49yo)  - I recommend a whole food plant-based diet, such as diet similar to the Dash or Mediterranean diets.  - I recommend trying exercise roughly 150min/wk.  - I recommend maintaining a healthy weight with a BMI less than 25.  - Postmenopausal women or women with osteoporosis should consume a minimum of 1200mg Calcium and 800IU of Vitamin D daily and participate in strength and/or resistance training.   - Do not smoke or vape.  If patient is smoking or vaping, and is unable to quit at this time, I recommend at least attempting to reduce their nicotine intake.  - Alcohol use should be limited and I would actually encourage alcohol cessation.  However, if patient decides to consume alcohol it should be done in moderate use, up to 1 drink/day.   - Do not use illicit drugs or illegal substances. Do not  use medications that are not prescribed to you, and do not take medications that are prescribed to someone else.   - Always consult your doctor prior to starting any new supplements, herbs, or dietary aides.   - Get enough sleep (7-8 hours/night)  - Visit the dentist twice yearly.  - Vision exam at least once per year.  - See orders below.   - Return in 1 year for well exam.         Relevant Orders    CBC and Auto Differential (Completed)    Comprehensive Metabolic Panel (Completed)    Lipid Panel (Completed)    TSH with reflex to Free T4 if abnormal (Completed)    Follow Up In [facility] - PCP - Health Maintenance       PREVENT    Follow-Up Recommendations: 1year    Please excuse any typos or grammatical errors, part of this note was constructed with Dragon dictation software.    Latricia Hartley DO, MSEd  [facility]   Office: [phone]  3/18/2025 11:48 PM

## 2025-03-19 LAB
ALBUMIN SERPL-MCNC: 4.7 G/DL (ref 3.6–5.1)
ALP SERPL-CCNC: 52 U/L (ref 37–153)
ALT SERPL-CCNC: 24 U/L (ref 6–29)
ANION GAP SERPL CALCULATED.4IONS-SCNC: 8 MMOL/L (CALC) (ref 7–17)
AST SERPL-CCNC: 24 U/L (ref 10–35)
BASOPHILS # BLD AUTO: 48 CELLS/UL (ref 0–200)
BASOPHILS NFR BLD AUTO: 0.6 %
BILIRUB SERPL-MCNC: 0.8 MG/DL (ref 0.2–1.2)
BUN SERPL-MCNC: 16 MG/DL (ref 7–25)
CALCIUM SERPL-MCNC: 9.6 MG/DL (ref 8.6–10.4)
CHLORIDE SERPL-SCNC: 105 MMOL/L (ref 98–110)
CHOLEST SERPL-MCNC: 239 MG/DL
CHOLEST/HDLC SERPL: 2.4 (CALC)
CO2 SERPL-SCNC: 26 MMOL/L (ref 20–32)
CREAT SERPL-MCNC: 0.91 MG/DL (ref 0.5–1.03)
EGFRCR SERPLBLD CKD-EPI 2021: 74 ML/MIN/1.73M2
EOSINOPHIL # BLD AUTO: 16 CELLS/UL (ref 15–500)
EOSINOPHIL NFR BLD AUTO: 0.2 %
ERYTHROCYTE [DISTWIDTH] IN BLOOD BY AUTOMATED COUNT: 12.2 % (ref 11–15)
GLUCOSE SERPL-MCNC: 90 MG/DL (ref 65–99)
HCT VFR BLD AUTO: 42.7 % (ref 35–45)
HDLC SERPL-MCNC: 101 MG/DL
HGB BLD-MCNC: 14 G/DL (ref 11.7–15.5)
LDLC SERPL CALC-MCNC: 124 MG/DL (CALC)
LYMPHOCYTES # BLD AUTO: 1200 CELLS/UL (ref 850–3900)
LYMPHOCYTES NFR BLD AUTO: 15 %
MCH RBC QN AUTO: 29.8 PG (ref 27–33)
MCHC RBC AUTO-ENTMCNC: 32.8 G/DL (ref 32–36)
MCV RBC AUTO: 90.9 FL (ref 80–100)
MONOCYTES # BLD AUTO: 648 CELLS/UL (ref 200–950)
MONOCYTES NFR BLD AUTO: 8.1 %
NEUTROPHILS # BLD AUTO: 6088 CELLS/UL (ref 1500–7800)
NEUTROPHILS NFR BLD AUTO: 76.1 %
NONHDLC SERPL-MCNC: 138 MG/DL (CALC)
PLATELET # BLD AUTO: 248 THOUSAND/UL (ref 140–400)
PMV BLD REES-ECKER: 9.8 FL (ref 7.5–12.5)
POTASSIUM SERPL-SCNC: 3.9 MMOL/L (ref 3.5–5.3)
PROT SERPL-MCNC: 7.1 G/DL (ref 6.1–8.1)
RBC # BLD AUTO: 4.7 MILLION/UL (ref 3.8–5.1)
SODIUM SERPL-SCNC: 139 MMOL/L (ref 135–146)
TRIGL SERPL-MCNC: 58 MG/DL
TSH SERPL-ACNC: 1.15 MIU/L (ref 0.4–4.5)
WBC # BLD AUTO: 8 THOUSAND/UL (ref 3.8–10.8)

## 2025-04-06 PROBLEM — Z00.00 WELL ADULT EXAM: Status: ACTIVE | Noted: 2025-04-06

## 2025-04-07 NOTE — ASSESSMENT & PLAN NOTE
Patient was here for her annual well exam today. Gynecologic exam was not performed today.   - We discussed family hx today, and family hx was updated in chart.   - Patient was screened for depression today.  - I recommend the following cancer screenings at this time:   [] Cervical cancer (21-28yo, 30-64yo, per [facility])  [x] Breast cancer (Biennial, 40-75yo)  [] Colon cancer (start 44yo or earlier if famhx)  [] Lung cancer (50-81yo w 20pk, current or quit w/in 15yr)  - I recommend the following additional screenings at this time:   [] Osteoporosis (Dexa >64yo, q2h if abnl. Sooner if long term steroid use, smoking, heavy EtOH use, hx of fracture, RA, low body weight, famhx hip fracture)  [] HIV (15-64yo, once in lifetime)  [] Hep C (18-78yo, once in lifetime)  [] Syphilis (people at increased risk)  [] GC/CT (F sexually active <26yo, >26yo at increased risk)  - I recommend the following vaccinations at this time:   [x] Tdap (q10y)  [] COVID (4yo+)  [] Influenza (annual)  [] HPV (9-44yo)  [x] Shingrix (>51yo)  [x] PNA (>51yo)  - I recommend a whole food plant-based diet, such as diet similar to the Dash or Mediterranean diets.  - I recommend trying exercise roughly 150min/wk.  - I recommend maintaining a healthy weight with a BMI less than 25.  - Postmenopausal women or women with osteoporosis should consume a minimum of 1200mg Calcium and 800IU of Vitamin D daily and participate in strength and/or resistance training.   - Do not smoke or vape.  If patient is smoking or vaping, and is unable to quit at this time, I recommend at least attempting to reduce their nicotine intake.  - Alcohol use should be limited and I would actually encourage alcohol cessation.  However, if patient decides to consume alcohol it should be done in moderate use, up to 1 drink/day.   - Do not use illicit drugs or illegal substances. Do not use medications that are not prescribed to you, and do not take medications that are prescribed to  someone else.   - Always consult your doctor prior to starting any new supplements, herbs, or dietary aides.   - Get enough sleep (7-8 hours/night)  - Visit the dentist twice yearly.  - Vision exam at least once per year.  - See orders below.   - Return in 1 year for well exam.

## 2025-06-06 ENCOUNTER — TELEPHONE (OUTPATIENT)
Dept: PRIMARY CARE | Facility: CLINIC | Age: 55
End: 2025-06-06
Payer: COMMERCIAL

## 2025-06-06 DIAGNOSIS — Z00.00 WELL ADULT EXAM: ICD-10-CM

## 2025-06-06 DIAGNOSIS — Z98.890 S/P THYROID SURGERY: Primary | ICD-10-CM

## 2025-06-06 DIAGNOSIS — D12.6 TUBULAR ADENOMA OF COLON: ICD-10-CM

## 2025-06-06 DIAGNOSIS — E55.9 MILD VITAMIN D DEFICIENCY: ICD-10-CM

## 2025-06-06 DIAGNOSIS — E89.0 POSTOPERATIVE HYPOTHYROIDISM: ICD-10-CM

## 2025-06-06 NOTE — TELEPHONE ENCOUNTER
BRIEF NOTE    Subjective/Objective:  Labs reviewed. Going to a hollistic person for her hormones.     The ASCVD Risk score (Stewart THOMPSON, et al., 2019) failed to calculate for the following reasons:    The valid HDL cholesterol range is 20 to 100 mg/dL    Ratio 2.4    Assessment/Plan:  - Will keep on yearly schedule at this time.   - Labs ordered for next year.     No problem-specific Assessment & Plan notes found for this encounter.    1. S/P thyroid surgery (Primary)  - TSH with reflex to Free T4 if abnormal; Future  - CBC and Auto Differential; Future  - Comprehensive Metabolic Panel; Future  - Hemoglobin A1C; Future  - Lipid Panel; Future  - Vitamin D 25-Hydroxy,Total (for eval of Vitamin D levels); Future  - Vitamin B12; Future  - TSH with reflex to Free T4 if abnormal  - CBC and Auto Differential  - Comprehensive Metabolic Panel  - Hemoglobin A1C  - Lipid Panel  - Vitamin D 25-Hydroxy,Total (for eval of Vitamin D levels)  - Vitamin B12    2. Mild vitamin D deficiency  - TSH with reflex to Free T4 if abnormal; Future  - CBC and Auto Differential; Future  - Comprehensive Metabolic Panel; Future  - Hemoglobin A1C; Future  - Lipid Panel; Future  - Vitamin D 25-Hydroxy,Total (for eval of Vitamin D levels); Future  - Vitamin B12; Future  - TSH with reflex to Free T4 if abnormal  - CBC and Auto Differential  - Comprehensive Metabolic Panel  - Hemoglobin A1C  - Lipid Panel  - Vitamin D 25-Hydroxy,Total (for eval of Vitamin D levels)  - Vitamin B12    3. Tubular adenoma of colon  - TSH with reflex to Free T4 if abnormal; Future  - CBC and Auto Differential; Future  - Comprehensive Metabolic Panel; Future  - Hemoglobin A1C; Future  - Lipid Panel; Future  - Vitamin D 25-Hydroxy,Total (for eval of Vitamin D levels); Future  - Vitamin B12; Future  - TSH with reflex to Free T4 if abnormal  - CBC and Auto Differential  - Comprehensive Metabolic Panel  - Hemoglobin A1C  - Lipid Panel  - Vitamin D 25-Hydroxy,Total (for eval of  Vitamin D levels)  - Vitamin B12    4. Postoperative hypothyroidism  - TSH with reflex to Free T4 if abnormal; Future  - CBC and Auto Differential; Future  - Comprehensive Metabolic Panel; Future  - Hemoglobin A1C; Future  - Lipid Panel; Future  - Vitamin D 25-Hydroxy,Total (for eval of Vitamin D levels); Future  - Vitamin B12; Future  - TSH with reflex to Free T4 if abnormal  - CBC and Auto Differential  - Comprehensive Metabolic Panel  - Hemoglobin A1C  - Lipid Panel  - Vitamin D 25-Hydroxy,Total (for eval of Vitamin D levels)  - Vitamin B12    5. Well adult exam  - TSH with reflex to Free T4 if abnormal; Future  - CBC and Auto Differential; Future  - Comprehensive Metabolic Panel; Future  - Hemoglobin A1C; Future  - Lipid Panel; Future  - Vitamin D 25-Hydroxy,Total (for eval of Vitamin D levels); Future  - Vitamin B12; Future  - TSH with reflex to Free T4 if abnormal  - CBC and Auto Differential  - Comprehensive Metabolic Panel  - Hemoglobin A1C  - Lipid Panel  - Vitamin D 25-Hydroxy,Total (for eval of Vitamin D levels)  - Vitamin B12      Latricia Hartley DO, MSEd  [facility]  Office: [phone]  6/6/2025 12:23 PM

## 2026-03-19 ENCOUNTER — APPOINTMENT (OUTPATIENT)
Dept: PRIMARY CARE | Facility: CLINIC | Age: 56
End: 2026-03-19
Payer: COMMERCIAL

## 2026-03-24 ENCOUNTER — APPOINTMENT (OUTPATIENT)
Dept: PRIMARY CARE | Facility: CLINIC | Age: 56
End: 2026-03-24
Payer: COMMERCIAL